# Patient Record
Sex: FEMALE | Race: WHITE
[De-identification: names, ages, dates, MRNs, and addresses within clinical notes are randomized per-mention and may not be internally consistent; named-entity substitution may affect disease eponyms.]

---

## 2018-04-06 ENCOUNTER — HOSPITAL ENCOUNTER (OUTPATIENT)
Dept: HOSPITAL 17 - NEDAMB | Age: 83
Setting detail: OBSERVATION
LOS: 3 days | Discharge: HOME | End: 2018-04-09
Attending: HOSPITALIST | Admitting: HOSPITALIST
Payer: MEDICARE

## 2018-04-06 VITALS
OXYGEN SATURATION: 95 % | TEMPERATURE: 96.2 F | RESPIRATION RATE: 18 BRPM | DIASTOLIC BLOOD PRESSURE: 63 MMHG | SYSTOLIC BLOOD PRESSURE: 140 MMHG | HEART RATE: 60 BPM

## 2018-04-06 VITALS — WEIGHT: 170.86 LBS | HEIGHT: 62 IN | BODY MASS INDEX: 31.44 KG/M2

## 2018-04-06 DIAGNOSIS — I25.10: ICD-10-CM

## 2018-04-06 DIAGNOSIS — R29.6: ICD-10-CM

## 2018-04-06 DIAGNOSIS — E11.649: Primary | ICD-10-CM

## 2018-04-06 DIAGNOSIS — R07.89: ICD-10-CM

## 2018-04-06 DIAGNOSIS — F41.9: ICD-10-CM

## 2018-04-06 DIAGNOSIS — S42.401D: ICD-10-CM

## 2018-04-06 DIAGNOSIS — M79.601: ICD-10-CM

## 2018-04-06 DIAGNOSIS — Z95.1: ICD-10-CM

## 2018-04-06 DIAGNOSIS — G89.29: ICD-10-CM

## 2018-04-06 DIAGNOSIS — M54.9: ICD-10-CM
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
## 2018-04-06 DIAGNOSIS — Z79.4: ICD-10-CM

## 2018-04-06 DIAGNOSIS — R45.851: ICD-10-CM

## 2018-04-06 DIAGNOSIS — Z95.810: ICD-10-CM

## 2018-04-06 DIAGNOSIS — I10: ICD-10-CM

## 2018-04-06 LAB
ALBUMIN SERPL-MCNC: 2.8 GM/DL (ref 3.4–5)
ALP SERPL-CCNC: 142 U/L (ref 45–117)
ALT SERPL-CCNC: 9 U/L (ref 10–53)
APAP SERPL-MCNC: (no result) MCG/ML (ref 10–30)
AST SERPL-CCNC: 19 U/L (ref 15–37)
BASOPHILS # BLD AUTO: 0.1 TH/MM3 (ref 0–0.2)
BASOPHILS NFR BLD: 0.9 % (ref 0–2)
BILIRUB SERPL-MCNC: 0.5 MG/DL (ref 0.2–1)
BUN SERPL-MCNC: 13 MG/DL (ref 7–18)
CALCIUM SERPL-MCNC: 8.7 MG/DL (ref 8.5–10.1)
CHLORIDE SERPL-SCNC: 106 MEQ/L (ref 98–107)
CREAT SERPL-MCNC: 0.82 MG/DL (ref 0.5–1)
EOSINOPHIL # BLD: 0.1 TH/MM3 (ref 0–0.4)
EOSINOPHIL NFR BLD: 2.3 % (ref 0–4)
ERYTHROCYTE [DISTWIDTH] IN BLOOD BY AUTOMATED COUNT: 16.2 % (ref 11.6–17.2)
GFR SERPLBLD BASED ON 1.73 SQ M-ARVRAT: 67 ML/MIN (ref 89–?)
GLUCOSE SERPL-MCNC: 34 MG/DL (ref 74–106)
HCO3 BLD-SCNC: 24.5 MEQ/L (ref 21–32)
HCT VFR BLD CALC: 30.1 % (ref 35–46)
HGB BLD-MCNC: 9.8 GM/DL (ref 11.6–15.3)
LYMPHOCYTES # BLD AUTO: 2.1 TH/MM3 (ref 1–4.8)
LYMPHOCYTES NFR BLD AUTO: 33 % (ref 9–44)
MCH RBC QN AUTO: 28.8 PG (ref 27–34)
MCHC RBC AUTO-ENTMCNC: 32.5 % (ref 32–36)
MCV RBC AUTO: 88.6 FL (ref 80–100)
MONOCYTE #: 0.6 TH/MM3 (ref 0–0.9)
MONOCYTES NFR BLD: 10 % (ref 0–8)
NEUTROPHILS # BLD AUTO: 3.4 TH/MM3 (ref 1.8–7.7)
NEUTROPHILS NFR BLD AUTO: 53.8 % (ref 16–70)
PLATELET # BLD: 203 TH/MM3 (ref 150–450)
PMV BLD AUTO: 8.2 FL (ref 7–11)
PROT SERPL-MCNC: 7.8 GM/DL (ref 6.4–8.2)
RBC # BLD AUTO: 3.4 MIL/MM3 (ref 4–5.3)
SODIUM SERPL-SCNC: 140 MEQ/L (ref 136–145)
WBC # BLD AUTO: 6.4 TH/MM3 (ref 4–11)

## 2018-04-06 PROCEDURE — 96372 THER/PROPH/DIAG INJ SC/IM: CPT

## 2018-04-06 PROCEDURE — 97116 GAIT TRAINING THERAPY: CPT

## 2018-04-06 PROCEDURE — 29105 APPLICATION LONG ARM SPLINT: CPT

## 2018-04-06 PROCEDURE — 80307 DRUG TEST PRSMV CHEM ANLYZR: CPT

## 2018-04-06 PROCEDURE — 96374 THER/PROPH/DIAG INJ IV PUSH: CPT

## 2018-04-06 PROCEDURE — 97166 OT EVAL MOD COMPLEX 45 MIN: CPT

## 2018-04-06 PROCEDURE — 96375 TX/PRO/DX INJ NEW DRUG ADDON: CPT

## 2018-04-06 PROCEDURE — 97163 PT EVAL HIGH COMPLEX 45 MIN: CPT

## 2018-04-06 PROCEDURE — 83036 HEMOGLOBIN GLYCOSYLATED A1C: CPT

## 2018-04-06 PROCEDURE — 80053 COMPREHEN METABOLIC PANEL: CPT

## 2018-04-06 PROCEDURE — 84443 ASSAY THYROID STIM HORMONE: CPT

## 2018-04-06 PROCEDURE — 76937 US GUIDE VASCULAR ACCESS: CPT

## 2018-04-06 PROCEDURE — 71111 X-RAY EXAM RIBS/CHEST4/> VWS: CPT

## 2018-04-06 PROCEDURE — 73090 X-RAY EXAM OF FOREARM: CPT

## 2018-04-06 PROCEDURE — 70450 CT HEAD/BRAIN W/O DYE: CPT

## 2018-04-06 PROCEDURE — 73060 X-RAY EXAM OF HUMERUS: CPT

## 2018-04-06 PROCEDURE — 82948 REAGENT STRIP/BLOOD GLUCOSE: CPT

## 2018-04-06 PROCEDURE — 85025 COMPLETE CBC W/AUTO DIFF WBC: CPT

## 2018-04-06 PROCEDURE — 73110 X-RAY EXAM OF WRIST: CPT

## 2018-04-06 PROCEDURE — G0378 HOSPITAL OBSERVATION PER HR: HCPCS

## 2018-04-06 PROCEDURE — 96361 HYDRATE IV INFUSION ADD-ON: CPT

## 2018-04-06 PROCEDURE — 99285 EMERGENCY DEPT VISIT HI MDM: CPT

## 2018-04-06 NOTE — RADRPT
EXAM DATE/TIME:  04/06/2018 19:46 

 

[facility] COMPARISON:     

No previous studies available for comparison.

 

                     

INDICATIONS :     

Rib pain with no known injury

                     

 

MEDICAL HISTORY :     

None.          

 

SURGICAL HISTORY :     

CABG. Pacemaker.  

 

ENCOUNTER:     

Initial                                        

 

ACUITY:     

1 day      

 

PAIN SCORE:     

10/10

 

LOCATION:     

Bilateral  ribs

 

FINDINGS:     

Pacer on the left.  Expiratory chest reveals no pneumothorax.  Nondisplaced fractures of the right ei
ghth and seventh ribs.

CONCLUSION:     Right rib fractures.  No pneumothorax.  Limited exam.  

 

 

 Manuel Garrett MD FACR on April 06, 2018 at 20:36           

Board Certified Radiologist.

 This report was verified electronically.

## 2018-04-06 NOTE — RADRPT
EXAM DATE/TIME:  04/06/2018 21:22 

 

[facility] COMPARISON:     No previous studies available for comparison.

 

                     

INDICATIONS :     Fall. Right upper arm pain.

                     

MEDICAL HISTORY :     None.          

SURGICAL HISTORY :     None.   

ENCOUNTER:     Initial                                        

ACUITY:     1 day      

PAIN SCORE:     8/10

LOCATION:     Right upper extremity 

 

FINDINGS:     

Plate with screws is seen bridging the fracture of the humerus.  Alignment is anatomic.  Skin staples
 are evident.

CONCLUSION:     Plate in good position.  Anatomic alignment.  

 

 Manuel Garrett MD FACR on April 06, 2018 at 21:43           

Board Certified Radiologist.

 This report was verified electronically.

## 2018-04-06 NOTE — RADRPT
EXAM DATE/TIME:  04/06/2018 19:52 

 

[facility] COMPARISON:     

No previous studies available for comparison.

 

                     

INDICATIONS :     

Right forearm pain with no known injury

                     

 

MEDICAL HISTORY :     

None.          

 

SURGICAL HISTORY :     

None.   

 

ENCOUNTER:     

Initial                                        

 

ACUITY:     

1 day      

 

PAIN SCORE:     

10/10

 

LOCATION:     

Right  entire forearm

 

FINDINGS:     

Limited exam because of uncooperative patient.  No displaced fracture.

CONCLUSION:     Very limited exam.  Subtle fractures cannot be excluded.  

 

 

 Manuel Garrett MD FACR on April 06, 2018 at 20:40           

Board Certified Radiologist.

 This report was verified electronically.

## 2018-04-06 NOTE — HHI.HP
__________________________________________________





HPI


Service


[facility]


Primary Care Physician


Unknown


Admission Diagnosis





acute hypoglycemia, DM on insulin, suicidal ideation, BA


Diagnoses:  


(1) Hypoglycemia


Diagnosis:  Principal





(2) Humerus fracture


Diagnosis:  Principal





(3) Suicidal ideation


Diagnosis:  Principal





Travel History


International Travel<30 Days:  No


Contact w/Intl Traveler <30 Da:  No


Traveled to Known Affected Are:  No


History of Present Illness


This is an 82-year-old female with a PMH of DM who is brought to the ER under 

Baker Act for suicidal ideation.  Per report, she had called her therapist to 

tell her she was having suicidal ideation and Police sent to her home.  Pt 

unable to give much history as lethargic on exam, however able to answer some 

questions in Estonian.  Tells me she takes Insulin 68u qam, which she took this 

morning, in addition to sliding scale Insulin.  Denies fever, chills, nausea or 

vomiting.  Per records, pt w/ recent fall for which she was seen in [facility], found to have humerus fracture, s/p surgery, currently in splint.  While 

in ER, pt noted to be hypoglycemic w/ BS 34, s/p Orange Juice and Dextrose, 

repeat BS 45, later 71. Pt remains lethargic.





Review of Systems


Except as stated in HPI:  all other systems reviewed are Neg


ROS: Limited secondary to lethargy





Past Family Social History


Past Medical History


PMH: DM


Past Surgical History


PAST SURGICAL HISTORY: Unknown


Allergies:  


Coded Allergies:  


     No Known Allergies (Unverified , 18)


Family History


PAST FAMILY HISTORY:  Reviewed.  No h/o DM or CAD


Social History


PAST SOCIAL HISTORY: Negative for alcohol, tobacco or drugs.





Physical Exam


Vital Signs





Vital Signs








  Date Time  Temp Pulse Resp B/P (MAP) Pulse Ox O2 Delivery O2 Flow Rate FiO2


 


18 18:24 96.2 60 18 140/63 (88) 95 Nasal Cannula 2.00 








Physical Exam


PE:


GENERAL: Elderly  female in no acute distress, lethargic but rouses and 

answers few questions.


HEENT: PERRLA, EOMI. No scleral icterus or conjunctival pallor. No lid lag or 

facial droop.  


CARDIOVASCULAR: Regular rate and rhythm.  No obvious murmurs to auscultation. 

No chest tenderness to palpation. 


RESPIRATORY: No obvious rhonchi or wheezing. Clear to auscultation. Breath 

sounds equal bilaterally. 


GASTROINTESTINAL: Abdomen soft, non-tender, nondistended. BS normal. 


MUSCULOSKELETAL: Extremities without clubbing, cyanosis, or edema. No obvious 

deformities. RUE s/p splint in sling.


NEUROLOGICAL: Lethargic, answers some questions. No focal neurologic deficits. 

Moving both upper and lower extremities spontaneously.


Laboratory





Laboratory Tests








Test


  18


18:20 18


20:58


 


White Blood Count 6.4  


 


Red Blood Count 3.40  


 


Hemoglobin 9.8  


 


Hematocrit 30.1  


 


Mean Corpuscular Volume 88.6  


 


Mean Corpuscular Hemoglobin 28.8  


 


Mean Corpuscular Hemoglobin


Concent 32.5 


  


 


 


Red Cell Distribution Width 16.2  


 


Platelet Count 203  


 


Mean Platelet Volume 8.2  


 


Neutrophils (%) (Auto) 53.8  


 


Lymphocytes (%) (Auto) 33.0  


 


Monocytes (%) (Auto) 10.0  


 


Eosinophils (%) (Auto) 2.3  


 


Basophils (%) (Auto) 0.9  


 


Neutrophils # (Auto) 3.4  


 


Lymphocytes # (Auto) 2.1  


 


Monocytes # (Auto) 0.6  


 


Eosinophils # (Auto) 0.1  


 


Basophils # (Auto) 0.1  


 


CBC Comment DIFF FINAL  


 


Differential Comment   


 


Salicylates Level LESS THAN 1.7  


 


Blood Urea Nitrogen  13 


 


Creatinine  0.82 


 


Random Glucose  34 


 


Total Protein  7.8 


 


Albumin  2.8 


 


Calcium Level  8.7 


 


Alkaline Phosphatase  142 


 


Aspartate Amino Transf


(AST/SGOT) 


  19 


 


 


Alanine Aminotransferase


(ALT/SGPT) 


  9 


 


 


Total Bilirubin  0.5 


 


Sodium Level  140 


 


Potassium Level  3.8 


 


Chloride Level  106 


 


Carbon Dioxide Level  24.5 


 


Anion Gap  10 


 


Estimat Glomerular Filtration


Rate 


  67 


 


 


Thyroid Stimulating Hormone


3rd Gen 


  3.040 


 


 


Acetaminophen Level  LESS THAN 2.0 


 


Ethyl Alcohol Level  LESS THAN 3 








Result Diagram:  


18








Caprini VTE Risk Assessment


Caprini VTE Risk Assessment:  No/Low Risk (score <= 1)


Caprini Risk Assessment Model











 Point Value = 1          Point Value = 2  Point Value = 3  Point Value = 5


 


Age 41-60


Minor surgery


BMI > 25 kg/m2


Swollen legs


Varicose veins


Pregnancy or postpartum


History of unexplained or recurrent


   spontaneous 


Oral contraceptives or hormone


   replacement


Sepsis (< 1 month)


Serious lung disease, including


   pneumonia (< 1 month)


Abnormal pulmonary function


Acute myocardial infarction


Congestive heart failure (< 1 month)


History of inflammatory bowel disease


Medical patient at bed rest Age 61-74


Arthroscopic surgery


Major open surgery (> 45 min)


Laparoscopic surgery (> 45 min)


Malignancy


Confined to bed (> 72 hours)


Immobilizing plaster cast


Central venous access Age >= 75


History of VTE


Family history of VTE


Factor V Leiden


Prothrombin 32186R


Lupus anticoagulant


Anticardiolipin antibodies


Elevated serum homocysteine


Heparin-induced thrombocytopenia


Other congenital or acquired


   thrombophilia Stroke (< 1 month)


Elective arthroplasty


Hip, pelvis, or leg fracture


Acute spinal cord injury (< 1 month)








Prophylaxis Regimen











   Total Risk


Factor Score Risk Level Prophylaxis Regimen


 


0-1      Low Early ambulation


 


2 Moderate Order ONE of the following:


*Sequential Compression Device (SCD)


*Heparin 5000 units SQ BID


 


3-4 Higher Order ONE of the following medications:


*Heparin 5000 units SQ TID


*Enoxaparin/Lovenox 40 mg SQ daily (WT < 150 kg, CrCl > 30 mL/min)


*Enoxaparin/Lovenox 30 mg SQ daily (WT < 150 kg, CrCl > 10-29 mL/min)


*Enoxaparin/Lovenox 30 mg SQ BID (WT < 150 kg, CrCl > 30 mL/min)


AND/OR


*Sequential Compression Device (SCD)


 


5 or more Highest Order ONE of the following medications:


*Heparin 5000 units SQ TID (Preferred with Epidurals)


*Enoxaparin/Lovenox 40 mg SQ daily (WT < 150 kg, CrCl > 30 mL/min)


*Enoxaparin/Lovenox 30 mg SQ daily (WT < 150 kg, CrCl > 10-29 mL/min)


*Enoxaparin/Lovenox 30 mg SQ BID (WT < 150 kg, CrCl > 30 mL/min)


AND


*Sequential Compression Device (SCD)











Assessment and Plan


Problem List:  


(1) Hypoglycemia


ICD Code:  E16.2 - Hypoglycemia, unspecified


Status:  Acute


(2) Humerus fracture


ICD Code:  S42.309A - Unspecified fracture of shaft of humerus, unspecified arm

, initial encounter for closed fracture


Status:  Acute


(3) Suicidal ideation


ICD Code:  R45.851 - Suicidal ideations


Status:  Acute


Assessment and Plan


A/P:


1.  Hypoglycemia:  BS 34 on arrival, s/p D50 and Orange juice w/ repeat BS 45 

and 71.  Reports taking Insulin 68u qam in addition to sliding scale, states 

took meds this morning.  Hold Insulin/Sliding Scale, start D5WNS for persistent 

hypoglycemia, Accu-cheks.  Check Hgb A1c. 


2.  Humerus Fx:  s/p recent fall w/ splint in [facility], outpatient Ortho 

follow up as scheduled.  Hold analgesics in light of lethargy/sedation.  

Humerus X-ray w/ plate in good position, images reviewed by me. 


3.  Suicidal Ideation:  currently under Baker Act by Police after pt called 

therapist and relayed suicidal ideation, apparently later stated it was a joke.

  Sitter.  Consult Psych in am for further assessment once mental status 

improved.


4.  DVT Prophylaxis:  SCD/Teds. 


5.  Social work for d/c planning as needed. 


6.  Case discussed w/ ER physician at length, labs/records/imaging reviewed by 

me.





Problem Qualifiers





(1) Humerus fracture:  


Qualified Codes:  S42.401D - Unspecified fracture of lower end of right humerus

, subsequent encounter for fracture with routine healing








Glenna Richards MD 2018 23:02

## 2018-04-06 NOTE — PD
HPI


Chief Complaint:  Psychiatric Symptoms


Time Seen by Provider:  19:19


Travel History


International Travel<30 days:  No


Contact w/Intl Traveler<30days:  No


Traveled to known affect area:  No





History of Present Illness


HPI


82-year-old female that presents to the ED for evaluation of Moulton act.  

Patient was brought here under a Baker act secondary for apparently contacting 

the  and telling them that she wanted to kill herself.  Per 

patient she told this as a joke and not really meant to be serious.  Before she 

knew it she had the police in her house and she was Moul[place] acted.  She denies 

depression or suicidal ideation.  She denies any history of anxiety.  Per 

patient she does tell me that she has had multiple falls recently and she was 

seen [place] at some point and had a fall that required apparently 

surgery to her right arm.  Patient is unable to give me much history.  She does 

appear to be somewhat altered.  I was able to figure out the patient does speak 

[place] and she is able to speak to me a little better in [place].  She states 

that she has been falling a couple times recently.  She cannot really tell me 

when.  She denies any allergies to medication.  Per patient she has a history 

of high blood pressure, diabetes and takes insulin.  She has no allergies to 

medication.  She was brought here as a Baker act under police custody.  She 

states that she has right rib pain as well as right arm pain.





[facility]


Past Medical History


Medical History:  Unable to Obtain


Tetanus Vaccination:  Unknown





Past Surgical History


Surgical History:  Unable to Obtain





Social History


Alcohol Use:  No


Tobacco Use:  No


Substance Use:  No





Allergies-Medications


(Allergen,Severity, Reaction):  


Coded Allergies:  


     No Known Allergies (Unverified , 4/6/18)





Review of Systems


ROS Limitations:  Poor Historian


Except as stated in HPI:  all other systems reviewed are Neg





Physical Exam


Exam Limitations:  Poor Historian


Narrative


GENERAL: 


SKIN: Warm and dry.


HEAD: Atraumatic. Normocephalic. 


EYES: Pupils equal and round. No scleral icterus. No injection or drainage. 


ENT: No nasal bleeding or discharge.  Mucous membranes pink and moist.  Tongue 

is midline.  No uvula deviation.


NECK: Trachea midline. No JVD. 


CARDIOVASCULAR: Regular rate and rhythm.  No murmurs, S3, S4.


RESPIRATORY: No accessory muscle use. Clear to auscultation. Breath sounds 

equal bilaterally. 


GASTROINTESTINAL: Abdomen soft, non-tender, nondistended. Hepatic and splenic 

margins not palpable. 


MUSCULOSKELETAL: Extremities without clubbing, cyanosis, or edema. No obvious 

deformities.  Full range of motion of the upper and lower extremities 

bilaterally with exception of the right arm were patient has what appears to be 

a long-arm splint that appears to be falling off her arm.  She does have 

tenderness to palpation on the right ribs.  No other deformities noted.


NEUROLOGICAL: Awake and alert. No obvious cranial nerve deficits.  Motor 

grossly within normal limits. Five out of 5 muscle strength in the arms and 

legs.  Normal speech.


PSYCHIATRIC: Appropriate mood and affect; insight and judgment normal.





Data


Data


Last Documented VS





Vital Signs








  Date Time  Temp Pulse Resp B/P (MAP) Pulse Ox O2 Delivery O2 Flow Rate FiO2


 


4/6/18 18:24 96.2 60 18 140/63 (88) 95 Nasal Cannula 2.00 








Orders





 Orders


Complete Blood Count With Diff (4/6/18 17:44)


Comprehensive Metabolic Panel (4/6/18 17:44)


Thyroid Stimulating Hormone (4/6/18 17:44)


Urinalysis - C+S If Indicated (4/6/18 17:44)


Psych Screen (4/6/18 17:44)


Drug Screen, Random Urine (4/6/18 17:44)


Alcohol (Ethanol) (4/6/18 17:44)


Salicylates (Aspirin) (4/6/18 17:44)


Tylenol (Acetaminophen) (4/6/18 17:44)


Ct Brain W/O Iv Contrast(Rout) (4/6/18 )


Ribs, Bilat(W/Exp Cxr-Min 4vw) (4/6/18 )


Forearm (2vws) (4/6/18 )


Acetamin-Hydrocod 325-5 Mg (Norco  5-325 (4/6/18 19:45)


Splint Or Brace Apply/Monitor (4/6/18 20:33)


Wrist, Complete (Wpz5sts) (4/6/18 )


Humerus (Min 2vws) (4/6/18 )


Dextrose 50% In Cherry (Syr) Inj (D50w (Syr (4/6/18 22:15)


Blood Glucose (4/6/18 22:36)


Dextrose 50% In Cherry (Vial) Inj (D50w (Vi (4/6/18 22:45)


^ Sitter (4/6/18 23:00)


Admit Order (Ed Use Only) (4/6/18 23:00)





Labs





Laboratory Tests








Test


  4/6/18


18:20 4/6/18


20:58


 


White Blood Count 6.4 TH/MM3  


 


Red Blood Count 3.40 MIL/MM3  


 


Hemoglobin 9.8 GM/DL  


 


Hematocrit 30.1 %  


 


Mean Corpuscular Volume 88.6 FL  


 


Mean Corpuscular Hemoglobin 28.8 PG  


 


Mean Corpuscular Hemoglobin


Concent 32.5 % 


  


 


 


Red Cell Distribution Width 16.2 %  


 


Platelet Count 203 TH/MM3  


 


Mean Platelet Volume 8.2 FL  


 


Neutrophils (%) (Auto) 53.8 %  


 


Lymphocytes (%) (Auto) 33.0 %  


 


Monocytes (%) (Auto) 10.0 %  


 


Eosinophils (%) (Auto) 2.3 %  


 


Basophils (%) (Auto) 0.9 %  


 


Neutrophils # (Auto) 3.4 TH/MM3  


 


Lymphocytes # (Auto) 2.1 TH/MM3  


 


Monocytes # (Auto) 0.6 TH/MM3  


 


Eosinophils # (Auto) 0.1 TH/MM3  


 


Basophils # (Auto) 0.1 TH/MM3  


 


CBC Comment DIFF FINAL  


 


Differential Comment   


 


Salicylates Level


  LESS THAN 1.7


MG/DL 


 


 


Blood Urea Nitrogen  13 MG/DL 


 


Creatinine  0.82 MG/DL 


 


Random Glucose  34 MG/DL 


 


Total Protein  7.8 GM/DL 


 


Albumin  2.8 GM/DL 


 


Calcium Level  8.7 MG/DL 


 


Alkaline Phosphatase  142 U/L 


 


Aspartate Amino Transf


(AST/SGOT) 


  19 U/L 


 


 


Alanine Aminotransferase


(ALT/SGPT) 


  9 U/L 


 


 


Total Bilirubin  0.5 MG/DL 


 


Sodium Level  140 MEQ/L 


 


Potassium Level  3.8 MEQ/L 


 


Chloride Level  106 MEQ/L 


 


Carbon Dioxide Level  24.5 MEQ/L 


 


Anion Gap  10 MEQ/L 


 


Estimat Glomerular Filtration


Rate 


  67 ML/MIN 


 


 


Thyroid Stimulating Hormone


3rd Gen 


  3.040 uIU/ML 


 


 


Acetaminophen Level


  


  LESS THAN 2.0


MCG/ML


 


Ethyl Alcohol Level


  


  LESS THAN 3


MG/DL











MDM


Medical Decision Making


Medical Screen Exam Complete:  Yes


Emergency Medical Condition:  Yes


Medical Record Reviewed:  Yes


Interpretation(s)


CBC & BMP Diagram


4/6/18 18:20








4/6/18 20:58








Total Protein 7.8, Albumin 2.8 L, Calcium Level 8.7, Alkaline Phosphatase 142 H

, Aspartate Amino Transf (AST/SGOT) 19, Alanine Aminotransferase (ALT/SGPT) 9 L

, Total Bilirubin 0.5








Last Impressions








Wrist X-Ray 4/6/18 0000 Signed





Impressions: 





 Service Date/Time:  Friday, April 6, 2018 21:24 - CONCLUSION:  Osteopenia with 





 degenerative changes, no fracture     Manuel Garrett MD  FACR


 


Ribs X-Ray 4/6/18 0000 Signed





Impressions: 





 Service Date/Time:  Friday, April 6, 2018 19:46 - CONCLUSION: Right rib 





 fractures.  No pneumothorax.  Limited exam.      MD CLARISA DiezR


 


Radius/Ulna X-Ray 4/6/18 0000 Signed





Impressions: 





 Service Date/Time:  Friday, April 6, 2018 19:52 - CONCLUSION: Very limited 

exam. 





  Subtle fractures cannot be excluded.      MD CLARISA DiezR


 


Humerus X-Ray 4/6/18 0000 Signed





Impressions: 





 Service Date/Time:  Friday, April 6, 2018 21:22 - CONCLUSION: Plate in good 





 position.  Anatomic alignment.     Manuel Garrett MD  FACR


 


Head CT 4/6/18 0000 Signed





Impressions: 





 Service Date/Time:  Friday, April 6, 2018 20:00 - CONCLUSION: Negative for 

acute 





 process     Manuel Garrett MD  FACR








Differential Diagnosis


Depression versus suicidal ideation versus anxiety versus adjustment disorder 

versus mood disorder versus bipolar disorder versus schizophrenia versus 

paranoid disorder versus psychosis versus substance abuse versus alcohol abuse 

versus alcohol induced psychosis versus homicidality addition versus cutting 

versus personality disorder versus hypoglycemia versus fall versus fracture 

versus inability to take care of self


Narrative Course


82-year-old female that presents to the ED for evaluation of a Moul[place] act.  

Patient was properly examined and was found to have signs and symptoms 

consistent appears to be a Baker act.  Clear of her history as patient appears 

to be somewhat difficult to get history from.  Patient does speak [place] but 

even when she speaks to me in [place] she seems to have a little difficulty 

remembering things.  Labs and imaging were ordered.  Arm had to be resplinted 

as it appears to be falling off.  Imaging did show what appears to have plates 

to her left humerus from her fracture.  She cannot really tell me how long ago 

that this happened but this appears to be recent.  She does appear to have 

rubbery pain which appears to be possibly chronic from the fall that caused a 

fracture.  X-rays did show fractures of the ribs here.  No sign of other 

disease.  Labs were positive for hypoglycemia.  I was able to get more history 

from her and she does tell me that she takes insulins and has a diabetic.  She 

was given dextrose 50 IV with no improvement of her sugars.  Her sugar was 

rechecked half an hour later was still 36.  She was given also juice and food.  

Another dose of D50 was given.  Still not dramatic improvement.  This was 

discussed with my attending Dr. Mccabe who recommends admission to medicine as 

patient cannot be medically cleared until alteration from hypoglycemia is 

fixed. [facility] was paged and Dr Richards agrees to obs admission.





Diagnosis





 Primary Impression:  


 Hypoglycemia


 Additional Impressions:  


 Suicidal ideation


 Humerus fracture


 Qualified Codes:  S42.401D - Unspecified fracture of lower end of right humerus

, subsequent encounter for fracture with routine healing











Jim Frias Apr 6, 2018 22:55

## 2018-04-06 NOTE — RADRPT
EXAM DATE/TIME:  04/06/2018 20:00 

 

[facility] COMPARISON:     

No previous studies available for comparison.

 

 

INDICATIONS :     

Altered mental status.

                      

 

RADIATION DOSE:     

36.49 CTDIvol (mGy) 

 

 

 

MEDICAL HISTORY :     

Non-responsive.  

 

SURGICAL HISTORY :      

Non-responsive. 

 

ENCOUNTER:      

Initial

 

ACUITY:      

1 day

 

PAIN SCALE:      

0/10

 

LOCATION:        

cranial 

 

TECHNIQUE:     

Multiple contiguous axial images were obtained of the head.  Using automated exposure control and adj
ustment of the mA and/or kV according to patient size, radiation dose was kept as low as reasonably a
chievable to obtain optimal diagnostic quality images.   DICOM format image data is available electro
nically for review and comparison.  

 

FINDINGS:     

 

CEREBRUM:     

The ventricles are normal for age.  No evidence of midline shift, mass lesion, hemorrhage or acute in
farction.  No extra-axial fluid collections are seen.

 

POSTERIOR FOSSA:     

The cerebellum and brainstem are intact.  The 4th ventricle is midline.  The cerebellopontine angle i
s unremarkable.

 

EXTRACRANIAL:     

The visualized portion of the orbits is intact.

 

SKULL:     

The calvaria is intact.  No evidence of skull fracture.

 

CONCLUSION:     Negative for acute process 

 

 

 Manuel Garrett MD FACR on April 06, 2018 at 20:41           

Board Certified Radiologist.

 This report was verified electronically.

## 2018-04-06 NOTE — RADRPT
EXAM DATE/TIME:  04/06/2018 21:24 

 

[facility] COMPARISON:     

No previous studies available for comparison.

 

                     

INDICATIONS :     

Fall. Right wrist pain.

                     

 

MEDICAL HISTORY :     

None.          

 

SURGICAL HISTORY :     

None.   

 

ENCOUNTER:     

Initial                                        

 

ACUITY:     

1 day      

 

PAIN SCORE:     

8/10

 

LOCATION:     

Right upper extremity 

 

FINDINGS:     

Degenerative changes about the carpus.  Alignment anatomic.  Fracture is not appreciated.  The family
 of the first metacarpal is noted.  Carpal deformity is evident.

 

CONCLUSION:     

Osteopenia with degenerative changes, no fracture

 

 

 

 Manuel Garrett MD FACR on April 06, 2018 at 21:44           

Board Certified Radiologist.

 This report was verified electronically.

## 2018-04-07 VITALS
SYSTOLIC BLOOD PRESSURE: 198 MMHG | DIASTOLIC BLOOD PRESSURE: 80 MMHG | OXYGEN SATURATION: 96 % | HEART RATE: 68 BPM | RESPIRATION RATE: 20 BRPM | TEMPERATURE: 97.9 F

## 2018-04-07 VITALS
TEMPERATURE: 98.8 F | HEART RATE: 60 BPM | DIASTOLIC BLOOD PRESSURE: 60 MMHG | SYSTOLIC BLOOD PRESSURE: 125 MMHG | OXYGEN SATURATION: 96 % | RESPIRATION RATE: 16 BRPM

## 2018-04-07 VITALS
HEART RATE: 61 BPM | SYSTOLIC BLOOD PRESSURE: 137 MMHG | OXYGEN SATURATION: 92 % | DIASTOLIC BLOOD PRESSURE: 62 MMHG | TEMPERATURE: 98.1 F | RESPIRATION RATE: 16 BRPM

## 2018-04-07 VITALS
HEART RATE: 65 BPM | SYSTOLIC BLOOD PRESSURE: 197 MMHG | DIASTOLIC BLOOD PRESSURE: 78 MMHG | RESPIRATION RATE: 20 BRPM | TEMPERATURE: 98.3 F | OXYGEN SATURATION: 96 %

## 2018-04-07 VITALS
HEART RATE: 62 BPM | OXYGEN SATURATION: 96 % | DIASTOLIC BLOOD PRESSURE: 72 MMHG | RESPIRATION RATE: 20 BRPM | TEMPERATURE: 98.2 F | SYSTOLIC BLOOD PRESSURE: 180 MMHG

## 2018-04-07 VITALS
HEART RATE: 62 BPM | SYSTOLIC BLOOD PRESSURE: 210 MMHG | TEMPERATURE: 98.6 F | DIASTOLIC BLOOD PRESSURE: 86 MMHG | RESPIRATION RATE: 18 BRPM | OXYGEN SATURATION: 62 %

## 2018-04-07 LAB
ALBUMIN SERPL-MCNC: 2.5 GM/DL (ref 3.4–5)
ALP SERPL-CCNC: 125 U/L (ref 45–117)
ALT SERPL-CCNC: 8 U/L (ref 10–53)
AST SERPL-CCNC: 17 U/L (ref 15–37)
BASOPHILS # BLD AUTO: 0 TH/MM3 (ref 0–0.2)
BASOPHILS NFR BLD: 0.5 % (ref 0–2)
BILIRUB SERPL-MCNC: 0.5 MG/DL (ref 0.2–1)
BUN SERPL-MCNC: 12 MG/DL (ref 7–18)
CALCIUM SERPL-MCNC: 8.7 MG/DL (ref 8.5–10.1)
CHLORIDE SERPL-SCNC: 104 MEQ/L (ref 98–107)
CREAT SERPL-MCNC: 0.6 MG/DL (ref 0.5–1)
EOSINOPHIL # BLD: 0.1 TH/MM3 (ref 0–0.4)
EOSINOPHIL NFR BLD: 1.1 % (ref 0–4)
ERYTHROCYTE [DISTWIDTH] IN BLOOD BY AUTOMATED COUNT: 16 % (ref 11.6–17.2)
GFR SERPLBLD BASED ON 1.73 SQ M-ARVRAT: 96 ML/MIN (ref 89–?)
GLUCOSE SERPL-MCNC: 110 MG/DL (ref 74–106)
HBA1C MFR BLD: 6.6 % (ref 4.3–6)
HCO3 BLD-SCNC: 29.2 MEQ/L (ref 21–32)
HCT VFR BLD CALC: 31.6 % (ref 35–46)
HGB BLD-MCNC: 10.5 GM/DL (ref 11.6–15.3)
LYMPHOCYTES # BLD AUTO: 1.7 TH/MM3 (ref 1–4.8)
LYMPHOCYTES NFR BLD AUTO: 22 % (ref 9–44)
MCH RBC QN AUTO: 29 PG (ref 27–34)
MCHC RBC AUTO-ENTMCNC: 33.2 % (ref 32–36)
MCV RBC AUTO: 87.3 FL (ref 80–100)
MONOCYTE #: 0.6 TH/MM3 (ref 0–0.9)
MONOCYTES NFR BLD: 8.1 % (ref 0–8)
NEUTROPHILS # BLD AUTO: 5.3 TH/MM3 (ref 1.8–7.7)
NEUTROPHILS NFR BLD AUTO: 68.3 % (ref 16–70)
PLATELET # BLD: 347 TH/MM3 (ref 150–450)
PMV BLD AUTO: 7.9 FL (ref 7–11)
PROT SERPL-MCNC: 6.9 GM/DL (ref 6.4–8.2)
RBC # BLD AUTO: 3.62 MIL/MM3 (ref 4–5.3)
SODIUM SERPL-SCNC: 139 MEQ/L (ref 136–145)
WBC # BLD AUTO: 7.7 TH/MM3 (ref 4–11)

## 2018-04-07 RX ADMIN — ATORVASTATIN CALCIUM SCH MG: 40 TABLET, FILM COATED ORAL at 17:49

## 2018-04-07 RX ADMIN — LISINOPRIL SCH MG: 20 TABLET ORAL at 17:48

## 2018-04-07 RX ADMIN — OXYCODONE HYDROCHLORIDE AND ACETAMINOPHEN PRN TAB: 7.5; 325 TABLET ORAL at 21:42

## 2018-04-07 RX ADMIN — ENOXAPARIN SODIUM SCH MG: 40 INJECTION SUBCUTANEOUS at 17:46

## 2018-04-07 RX ADMIN — METOPROLOL TARTRATE SCH MG: 50 TABLET, FILM COATED ORAL at 17:51

## 2018-04-07 RX ADMIN — STANDARDIZED SENNA CONCENTRATE AND DOCUSATE SODIUM SCH TAB: 8.6; 5 TABLET, FILM COATED ORAL at 17:48

## 2018-04-07 RX ADMIN — ISOSORBIDE MONONITRATE SCH MG: 60 TABLET, EXTENDED RELEASE ORAL at 17:46

## 2018-04-07 RX ADMIN — Medication SCH ML: at 09:00

## 2018-04-07 RX ADMIN — Medication SCH ML: at 21:42

## 2018-04-07 RX ADMIN — OXYCODONE HYDROCHLORIDE AND ACETAMINOPHEN PRN TAB: 7.5; 325 TABLET ORAL at 17:48

## 2018-04-07 RX ADMIN — GABAPENTIN SCH MG: 300 CAPSULE ORAL at 17:47

## 2018-04-07 RX ADMIN — STANDARDIZED SENNA CONCENTRATE AND DOCUSATE SODIUM SCH TAB: 8.6; 5 TABLET, FILM COATED ORAL at 09:00

## 2018-04-07 RX ADMIN — PANTOPRAZOLE SODIUM SCH MG: 20 TABLET, DELAYED RELEASE ORAL at 17:47

## 2018-04-07 RX ADMIN — INSULIN ASPART SCH: 100 INJECTION, SOLUTION INTRAVENOUS; SUBCUTANEOUS at 21:42

## 2018-04-07 NOTE — PD.PSY.CON
Provisional Diagnosis


Admission Date


Apr 6, 2018 at 23:02


Hana I.


Psychological behavioral factors associated with disorders of disease 

classified elsewhere





History of Present Illness


Service


Psychiatry


Consult Requested By


ED


Reason for Consult


[place] act, suicidal ideation


Primary Care Physician


Unknown


HPI


Patient is a 82-year-old  woman, , domiciled with daughter and 

her , with no formal past psychiatric history, past medical history 

significant for chronic back pain, diabetes, who was brought under [place] act 

after patient had told  that she wanted to kill herself which 

psychiatry was consulted for further evaluation.  Patient was found lying 

hospital bed noted calm and cooperative.  Patient states that she had recently 

fallen and [place] at her her arm which after being discharged back home 

had home health services put in place for assistance and services and when 

visiting services had come to help patient with she stated "life is miserable" 

which patient had been interpreted to have made a suicidal statement and 

subsequently put under Baker act and brought to the hospital for further 

evaluation and management.  Patient states that she would never take her life, 

she had a children, to have passed away due to motor vehicle accidents, the 

most recent being a couple of months ago.  Patient states that she loves life, 

is happy, recently had a  move in 2-3 weeks ago in her home, future 

oriented, states that she would never do anything to end her life.  Collateral 

information obtained by daughter states "my mother would never say that, she 

loves herself too much".  She reports that patient has never had any previous 

episode of having expressed any suicide ideations, no previous depressive 

symptoms, no episodes of isolation or crying, or noted to be sad or depressed.  

She states that she is expecting her mother come home with no safety concerns 

at this time.


Family psychiatric history: Denies


Past psychiatric history: Denies previous psychiatric diagnoses, no prior 

psychiatric admissions, no previous suicide attempt or self-injurious behavior, 

no previous health services, no medication trials in the past.  No history of 

abuse.


Past medical history: Chronic back pain, diabetes


Allergies: Penicillin, Versed


Substance use history: Denies


Social history:  for the past 5 years, domiciled with daughter and new 

 for the past 2-3 weeks, unemployed.  Patient's collateral contact is 

Cathy Bowers,[phone].





Past Family Social History


Coded Allergies:  


     No Known Allergies (Unverified , 4/6/18)





Current Medications








 Medications


  (Trade)  Dose


 Ordered  Sig/Renzo


 Route  Start Time


 Stop Time Status Last Admin


 


  (D50w (Vial) Inj)  50 ml  UNSCH  PRN


 IV PUSH  4/6/18 23:00


     


 


 


  (Glucagon Inj)  1 mg  UNSCH  PRN


 OTHER  4/6/18 23:00


     


 


 


  (NS Flush)  2 ml  UNSCH  PRN


 IV FLUSH  4/6/18 23:00


     


 


 


  (NS Flush)  2 ml  BID


 IV FLUSH  4/7/18 09:00


    4/7/18 09:00


 


 


  (Zofran Inj)  4 mg  Q6H  PRN


 IVP  4/6/18 23:00


     


 


 


  (Tylenol)  650 mg  Q6H  PRN


 PO  4/6/18 23:00


    4/7/18 04:26


 


 


  (Ariella-Colace)  1 tab  BID


 PO  4/7/18 09:00


     


 


 


  (Milk Of


 Magnesia Liq)  30 ml  Q12H  PRN


 PO  4/6/18 23:00


     


 


 


  (Senokot)  17.2 mg  Q12H  PRN


 PO  4/6/18 23:00


     


 


 


  (Dulcolax Supp)  10 mg  DAILY  PRN


 RECTAL  4/6/18 23:00


     


 


 


  (Lactulose Liq)  30 ml  DAILY  PRN


 PO  4/6/18 23:00


     


 


 


  (Norco  5-325 Mg)  1 tab  Q4H  PRN


 PO  4/7/18 13:00


    4/7/18 12:35


 











Physical Exam


Vital Signs





Vital Signs








  Date Time  Temp Pulse Resp B/P (MAP) Pulse Ox O2 Delivery O2 Flow Rate FiO2


 


4/7/18 12:07 98.2 62 20 180/72 (108) 96   


 


4/6/18 18:24      Nasal Cannula 2.00 














I/O   


 


 4/7/18 4/7/18 4/8/18





 08:00 16:00 00:00


 


Intake Total 200 ml  


 


Balance 200 ml  








Lab Results











Test


  4/6/18


18:20 4/6/18


20:58 4/7/18


06:59


 


White Blood Count 6.4 TH/MM3   7.7 TH/MM3 


 


Red Blood Count 3.40 MIL/MM3   3.62 MIL/MM3 


 


Hemoglobin 9.8 GM/DL   10.5 GM/DL 


 


Hematocrit 30.1 %   31.6 % 


 


Mean Corpuscular Volume 88.6 FL   87.3 FL 


 


Mean Corpuscular Hemoglobin 28.8 PG   29.0 PG 


 


Mean Corpuscular Hemoglobin


Concent 32.5 % 


  


  33.2 % 


 


 


Red Cell Distribution Width 16.2 %   16.0 % 


 


Platelet Count 203 TH/MM3   347 TH/MM3 


 


Mean Platelet Volume 8.2 FL   7.9 FL 


 


Neutrophils (%) (Auto) 53.8 %   68.3 % 


 


Lymphocytes (%) (Auto) 33.0 %   22.0 % 


 


Monocytes (%) (Auto) 10.0 %   8.1 % 


 


Eosinophils (%) (Auto) 2.3 %   1.1 % 


 


Basophils (%) (Auto) 0.9 %   0.5 % 


 


Neutrophils # (Auto) 3.4 TH/MM3   5.3 TH/MM3 


 


Lymphocytes # (Auto) 2.1 TH/MM3   1.7 TH/MM3 


 


Monocytes # (Auto) 0.6 TH/MM3   0.6 TH/MM3 


 


Eosinophils # (Auto) 0.1 TH/MM3   0.1 TH/MM3 


 


Basophils # (Auto) 0.1 TH/MM3   0.0 TH/MM3 


 


CBC Comment DIFF FINAL   DIFF FINAL 


 


Differential Comment     


 


Salicylates Level


  LESS THAN 1.7


MG/DL 


  


 


 


Blood Urea Nitrogen  13 MG/DL  12 MG/DL 


 


Creatinine  0.82 MG/DL  0.60 MG/DL 


 


Random Glucose  34 MG/DL  110 MG/DL 


 


Total Protein  7.8 GM/DL  6.9 GM/DL 


 


Albumin  2.8 GM/DL  2.5 GM/DL 


 


Calcium Level  8.7 MG/DL  8.7 MG/DL 


 


Alkaline Phosphatase  142 U/L  125 U/L 


 


Aspartate Amino Transf


(AST/SGOT) 


  19 U/L 


  17 U/L 


 


 


Alanine Aminotransferase


(ALT/SGPT) 


  9 U/L 


  8 U/L 


 


 


Total Bilirubin  0.5 MG/DL  0.5 MG/DL 


 


Sodium Level  140 MEQ/L  139 MEQ/L 


 


Potassium Level  3.8 MEQ/L  4.1 MEQ/L 


 


Chloride Level  106 MEQ/L  104 MEQ/L 


 


Carbon Dioxide Level  24.5 MEQ/L  29.2 MEQ/L 


 


Anion Gap  10 MEQ/L  6 MEQ/L 


 


Estimat Glomerular Filtration


Rate 


  67 ML/MIN 


  96 ML/MIN 


 


 


Thyroid Stimulating Hormone


3rd Gen 


  3.040 uIU/ML 


  


 


 


Acetaminophen Level


  


  LESS THAN 2.0


MCG/ML 


 


 


Ethyl Alcohol Level


  


  LESS THAN 3


MG/DL 


 


 


Hemoglobin A1c   6.6 % 











Assessment & Plan


Problem List:  


(1) Psychological and behavioral factors associated with disorders or diseases 

classified elsewhere


ICD Codes:  F54 - Psychological and behavioral factors associated with 

disorders or diseases classified elsewhere


Assessment & Plan


Patient is a 82-year-old  woman with no past psychiatric history, was 

brought in under [place] act due to reported suicide ideations which patient 

denies at this time and collateral formation confirm the patient with no 

history of depression or self-injurious behavior or suicide attempt in the 

past.  Patient this time is psychiatrically clear, currently not a danger to 

self or others.  Moul[place] act will be listed.  Patient to continue recommendations 

as per prior medical team.  Patient's daughter had expressed difficulty with 

being able to think of patient,  to assist in having patient 

transported back home when medically cleared.  Consult appreciated.











Cezar Torres MD Apr 7, 2018 15:43

## 2018-04-08 VITALS — HEART RATE: 63 BPM

## 2018-04-08 VITALS
DIASTOLIC BLOOD PRESSURE: 66 MMHG | SYSTOLIC BLOOD PRESSURE: 146 MMHG | OXYGEN SATURATION: 95 % | HEART RATE: 60 BPM | RESPIRATION RATE: 16 BRPM | TEMPERATURE: 98.7 F

## 2018-04-08 VITALS
SYSTOLIC BLOOD PRESSURE: 187 MMHG | DIASTOLIC BLOOD PRESSURE: 78 MMHG | HEART RATE: 61 BPM | RESPIRATION RATE: 16 BRPM | TEMPERATURE: 98.7 F | OXYGEN SATURATION: 97 %

## 2018-04-08 VITALS
SYSTOLIC BLOOD PRESSURE: 154 MMHG | HEART RATE: 66 BPM | OXYGEN SATURATION: 98 % | TEMPERATURE: 97.9 F | RESPIRATION RATE: 20 BRPM | DIASTOLIC BLOOD PRESSURE: 68 MMHG

## 2018-04-08 VITALS
DIASTOLIC BLOOD PRESSURE: 79 MMHG | TEMPERATURE: 98.2 F | HEART RATE: 60 BPM | SYSTOLIC BLOOD PRESSURE: 180 MMHG | RESPIRATION RATE: 18 BRPM | OXYGEN SATURATION: 96 %

## 2018-04-08 VITALS
SYSTOLIC BLOOD PRESSURE: 149 MMHG | RESPIRATION RATE: 20 BRPM | TEMPERATURE: 97.9 F | OXYGEN SATURATION: 96 % | HEART RATE: 66 BPM | DIASTOLIC BLOOD PRESSURE: 73 MMHG

## 2018-04-08 RX ADMIN — PANTOPRAZOLE SODIUM SCH MG: 20 TABLET, DELAYED RELEASE ORAL at 09:52

## 2018-04-08 RX ADMIN — ENOXAPARIN SODIUM SCH MG: 40 INJECTION SUBCUTANEOUS at 19:20

## 2018-04-08 RX ADMIN — GABAPENTIN SCH MG: 300 CAPSULE ORAL at 09:53

## 2018-04-08 RX ADMIN — GABAPENTIN SCH MG: 300 CAPSULE ORAL at 19:20

## 2018-04-08 RX ADMIN — LISINOPRIL SCH MG: 20 TABLET ORAL at 09:53

## 2018-04-08 RX ADMIN — INSULIN ASPART SCH: 100 INJECTION, SOLUTION INTRAVENOUS; SUBCUTANEOUS at 19:12

## 2018-04-08 RX ADMIN — Medication SCH ML: at 21:46

## 2018-04-08 RX ADMIN — ISOSORBIDE MONONITRATE SCH MG: 60 TABLET, EXTENDED RELEASE ORAL at 06:03

## 2018-04-08 RX ADMIN — STANDARDIZED SENNA CONCENTRATE AND DOCUSATE SODIUM SCH TAB: 8.6; 5 TABLET, FILM COATED ORAL at 21:46

## 2018-04-08 RX ADMIN — Medication SCH ML: at 09:00

## 2018-04-08 RX ADMIN — STANDARDIZED SENNA CONCENTRATE AND DOCUSATE SODIUM SCH TAB: 8.6; 5 TABLET, FILM COATED ORAL at 09:52

## 2018-04-08 RX ADMIN — ATORVASTATIN CALCIUM SCH MG: 40 TABLET, FILM COATED ORAL at 09:54

## 2018-04-08 RX ADMIN — INSULIN ASPART SCH: 100 INJECTION, SOLUTION INTRAVENOUS; SUBCUTANEOUS at 21:47

## 2018-04-08 RX ADMIN — DULOXETINE HYDROCHLORIDE SCH MG: 30 CAPSULE, DELAYED RELEASE ORAL at 09:52

## 2018-04-08 RX ADMIN — ASPIRIN 81 MG SCH MG: 81 TABLET ORAL at 09:53

## 2018-04-08 RX ADMIN — INSULIN ASPART SCH UNITS: 100 INJECTION, SUSPENSION SUBCUTANEOUS at 19:26

## 2018-04-08 RX ADMIN — GABAPENTIN SCH MG: 300 CAPSULE ORAL at 13:42

## 2018-04-08 RX ADMIN — OXYCODONE HYDROCHLORIDE AND ACETAMINOPHEN PRN TAB: 7.5; 325 TABLET ORAL at 06:04

## 2018-04-08 RX ADMIN — METOPROLOL TARTRATE SCH MG: 50 TABLET, FILM COATED ORAL at 09:52

## 2018-04-08 RX ADMIN — INSULIN ASPART SCH: 100 INJECTION, SOLUTION INTRAVENOUS; SUBCUTANEOUS at 08:00

## 2018-04-08 RX ADMIN — INSULIN ASPART SCH: 100 INJECTION, SOLUTION INTRAVENOUS; SUBCUTANEOUS at 12:00

## 2018-04-09 VITALS
HEART RATE: 59 BPM | OXYGEN SATURATION: 97 % | DIASTOLIC BLOOD PRESSURE: 73 MMHG | RESPIRATION RATE: 16 BRPM | SYSTOLIC BLOOD PRESSURE: 176 MMHG | TEMPERATURE: 98.7 F

## 2018-04-09 VITALS — HEART RATE: 63 BPM

## 2018-04-09 VITALS
SYSTOLIC BLOOD PRESSURE: 155 MMHG | HEART RATE: 64 BPM | DIASTOLIC BLOOD PRESSURE: 70 MMHG | OXYGEN SATURATION: 98 % | TEMPERATURE: 97.7 F | RESPIRATION RATE: 16 BRPM

## 2018-04-09 VITALS
SYSTOLIC BLOOD PRESSURE: 160 MMHG | HEART RATE: 63 BPM | OXYGEN SATURATION: 99 % | RESPIRATION RATE: 16 BRPM | DIASTOLIC BLOOD PRESSURE: 70 MMHG | TEMPERATURE: 97.8 F

## 2018-04-09 VITALS
HEART RATE: 64 BPM | SYSTOLIC BLOOD PRESSURE: 170 MMHG | DIASTOLIC BLOOD PRESSURE: 71 MMHG | TEMPERATURE: 98 F | RESPIRATION RATE: 16 BRPM

## 2018-04-09 RX ADMIN — LISINOPRIL SCH MG: 20 TABLET ORAL at 08:48

## 2018-04-09 RX ADMIN — GABAPENTIN SCH MG: 300 CAPSULE ORAL at 08:47

## 2018-04-09 RX ADMIN — INSULIN ASPART SCH: 100 INJECTION, SOLUTION INTRAVENOUS; SUBCUTANEOUS at 08:46

## 2018-04-09 RX ADMIN — STANDARDIZED SENNA CONCENTRATE AND DOCUSATE SODIUM SCH TAB: 8.6; 5 TABLET, FILM COATED ORAL at 08:48

## 2018-04-09 RX ADMIN — INSULIN ASPART SCH UNITS: 100 INJECTION, SUSPENSION SUBCUTANEOUS at 08:00

## 2018-04-09 RX ADMIN — ATORVASTATIN CALCIUM SCH MG: 40 TABLET, FILM COATED ORAL at 08:48

## 2018-04-09 RX ADMIN — ISOSORBIDE MONONITRATE SCH MG: 60 TABLET, EXTENDED RELEASE ORAL at 06:11

## 2018-04-09 RX ADMIN — OXYCODONE HYDROCHLORIDE AND ACETAMINOPHEN PRN TAB: 7.5; 325 TABLET ORAL at 08:48

## 2018-04-09 RX ADMIN — GABAPENTIN SCH MG: 300 CAPSULE ORAL at 13:18

## 2018-04-09 RX ADMIN — INSULIN ASPART SCH: 100 INJECTION, SOLUTION INTRAVENOUS; SUBCUTANEOUS at 13:31

## 2018-04-09 RX ADMIN — ASPIRIN 81 MG SCH MG: 81 TABLET ORAL at 08:47

## 2018-04-09 RX ADMIN — METOPROLOL TARTRATE SCH MG: 50 TABLET, FILM COATED ORAL at 08:47

## 2018-04-09 RX ADMIN — PANTOPRAZOLE SODIUM SCH MG: 20 TABLET, DELAYED RELEASE ORAL at 08:48

## 2018-04-09 RX ADMIN — Medication SCH ML: at 08:48

## 2018-04-09 RX ADMIN — DULOXETINE HYDROCHLORIDE SCH MG: 30 CAPSULE, DELAYED RELEASE ORAL at 08:48

## 2018-04-09 NOTE — HHI.FF
Face to Face Verification


Diagnosis:  


(1) Diabetes mellitus


(2) Depression


(3) Humerus fracture


(4) Hypoglycemia


Physical Therapy


Order:  Evaluate and Treat, Improve ambulation, Strength and gait training





Home Health Nursing








Order: Medical education





 Signs/symptoms of disease process





 Diabetic education





 Nursing assessment with vital signs

















I have seen patient Cathy Barrera on 4/9/18. My clinical findings support the 

need for the requested home health care services because:








 Ltd mobility - disease progression





 Deconditioned w/ increased weakness





 Med compliance is questionable





 Limited ability to care for self














I certify that my clinical findings support that this patient is homebound 

because:








 Unsteady gait/balance





 Unsafe to leave home unassisted





 Unable to use public transportation

















Stefanie Wade PA-C Apr 9, 2018 7:36 am

## 2018-04-09 NOTE — HHI.DS
__________________________________________________





Discharge Summary


Admission Date


Apr 6, 2018 at 11:02 pm


Discharge Date:  Apr 9, 2018


Admitting Diagnosis





acute hypoglycemia, DM on insulin, suicidal ideation, BA





(1) Hypoglycemia


ICD Code:  E16.2 - Hypoglycemia, unspecified


Status:  Acute


(2) Humerus fracture


ICD Code:  S42.309A - Unspecified fracture of shaft of humerus, unspecified arm

, initial encounter for closed fracture


Status:  Acute


(3) Suicidal ideation


ICD Code:  R45.851 - Suicidal ideations


Status:  Acute


Procedures


None.


Brief History - From Admission


This is an 82-year-old female with a PMH of DM who is brought to the ER under 

Baker Act for suicidal ideation.  Per report, she had called her therapist to 

tell her she was having suicidal ideation and Police sent to her home.  Pt 

unable to give much history as lethargic on exam, however able to answer some 

questions in Syriac.  Tells me she takes Insulin 68u qam, which she took this 

morning, in addition to sliding scale Insulin.  Denies fever, chills, nausea or 

vomiting.  Per records, pt w/ recent fall for which she was seen in [place], found to have humerus fracture, s/p surgery, currently in splint.  While 

in ER, pt noted to be hypoglycemic w/ BS 34, s/p Orange Juice and Dextrose, 

repeat BS 45, later 71. Pt remains lethargic.


CBC/BMP:  


4/7/18 0659                                                                    

            4/7/18 0659





Significant Findings





Laboratory Tests








Test


  4/6/18


18:20 4/6/18


20:58 4/7/18


06:59


 


Red Blood Count


  3.40 MIL/MM3


(4.00-5.30) 


  3.62 MIL/MM3


(4.00-5.30)


 


Hemoglobin


  9.8 GM/DL


(11.6-15.3) 


  10.5 GM/DL


(11.6-15.3)


 


Hematocrit


  30.1 %


(35.0-46.0) 


  31.6 %


(35.0-46.0)


 


Monocytes (%) (Auto)


  10.0 %


(0.0-8.0) 


  8.1 %


(0.0-8.0)


 


Salicylates Level


  LESS THAN 1.7


MG/DL 


  


 


 


Random Glucose


  


  34 MG/DL


() 110 MG/DL


()


 


Albumin


  


  2.8 GM/DL


(3.4-5.0) 2.5 GM/DL


(3.4-5.0)


 


Alkaline Phosphatase


  


  142 U/L


() 125 U/L


()


 


Alanine Aminotransferase


(ALT/SGPT) 


  9 U/L (10-53) 


  8 U/L (10-53) 


 


 


Estimat Glomerular Filtration


Rate 


  67 ML/MIN


(>89) 


 


 


Acetaminophen Level


  


  LESS THAN 2.0


MCG/ML 


 


 


Hemoglobin A1c


  


  


  6.6 %


(4.3-6.0)








Imaging





Last Impressions








Wrist X-Ray 4/6/18 0000 Signed





Impressions: 





 Service Date/Time:  Friday, April 6, 2018 21:24 - CONCLUSION:  Osteopenia with 





 degenerative changes, no fracture     Manuel Garrett MD  FACR


 


Ribs X-Ray 4/6/18 0000 Signed





Impressions: 





 Service Date/Time:  Friday, April 6, 2018 19:46 - CONCLUSION: Right rib 





 fractures.  No pneumothorax.  Limited exam.      Manuel Garrett MD  FACR


 


Radius/Ulna X-Ray 4/6/18 0000 Signed





Impressions: 





 Service Date/Time:  Friday, April 6, 2018 19:52 - CONCLUSION: Very limited 

exam. 





  Subtle fractures cannot be excluded.      Manuel Garrett MD  FACR


 


Humerus X-Ray 4/6/18 0000 Signed





Impressions: 





 Service Date/Time:  Friday, April 6, 2018 21:22 - CONCLUSION: Plate in good 





 position.  Anatomic alignment.     Manuel Garrett MD  FACR


 


Head CT 4/6/18 0000 Signed





Impressions: 





 Service Date/Time:  Friday, April 6, 2018 20:00 - CONCLUSION: Negative for 

acute 





 process     Manuel Garrett MD  FACR








PE at Discharge


GENERAL: Well-nourished, well-developed elderly female patient in NAD.


SKIN: Warm and dry. No rash.


HEENT:  Normocephalic. Atraumatic.Pupils equal and round. Mucous membranes pink 

and moist.


NECK: Supple. Trachea midline.  


CARDIOVASCULAR: Regular rate and rhythm.  S1, S2 noted. No murmur appreciated. 

AICD left chest. 


RESPIRATORY: No accessory muscle use. Clear to auscultation. Breath sounds 

equal bilaterally.  


GASTROINTESTINAL: Abdomen soft, non-tender, nondistended. Normoactive bowel 

sounds x4.


MUSCULOSKELETAL: Extremities without clubbing, cyanosis, or edema. RUE in splint

/ACE wrap with sling. 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 

and well-healed. 


NEUROLOGICAL: Awake and alert. No obvious cranial nerve deficits.  Motor 

grossly within normal limits.  Normal speech.


PSYCHIATRIC: Slightly anxious; insight and judgment normal.


Pt update on day of discharge


Follow up for hypoglycemia. The patient reports feeling well today and wants to 

go home. Blood sugars stable. Denies any headache, lightheadedness, dizziness, 

chest pain, shortness breath, or abdominal complaints.  She admits to feeling 

slightly anxious but she believes this is due to not receiving her Xanax while 

in the hospital.  She states she takes her Xanax 2-3x daily at home.  Discussed 

changes to her 70/30 insulin dosing, patient verbalized understanding.  She has 

no new medical complaints at this time.


Hospital Course


82-year-old female with a PMH of DM who is brought to the ER under Baker Act 

for suicidal ideation.  Patient found to be lethargic.





DM 2, with hypoglycemia: Patient has been taking 70/30 insulin 68 units in a.m. 

and 35 units in p.m. initial blood glucose on ED arrival was 34. s/p D50 and 

Orange juice w/ repeat BS 45 and 71. She received D5 NS overnight 4/6-4/7. BG 

improving, discontinued fluids. HgbA1c 6.6. Monitor accu-checks and cover with 

SSI. BG started to increase to 180s, therefore started on 70/30 10u bid. BG in 

the low 100s, therefore decreased to 70/30 5u bid at discharge. Diabetic 

education ordered and will be completed prior to discharge. BG stable. 

Instructed patient to keep BG log and follow up with PCP for dosing 

adjustments. 





Suicidal ideation: Denies any suicidal ideation on exam. Psychiatry consulted, 

Lifted Moul[place] act.





Humerus fracture S/p recent fall w/ splint in [place], outpatient Ortho 

follow up as scheduled.  Humerus X-ray w/ plate in good position. Patient has 

been taking oxycodone from home, continued oxycodone. 





Anxiety: Patient is on alprazolam 0.5 at home, advised on caution with this 

medication. Outpatient f/up. 





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 Continued home meds. Stable.


Pt Condition on Discharge:  Stable


Discharge Disposition:  Disch w/ Home Health Serv


Discharge Time:  > 30 minutes


Discharge Instructions


DIET: Follow Instructions for:  Diabetic Diet


Activities you can perform:  Regular-No Restrictions


Follow up Referrals:  


Orthopedics - 1 Week


Pain Management


PCP Follow-up - 2-3 Days





New Medications:  


Insulin Human Isophane-Regular 70-30 Inj (Novolin 70-30 Inj) 1,000 Unit/10 Ml 

Vial


5 UNITS SQ BID for Blood Sugar Management for 30 Days, #60 INJECTION 0 Refills





 


Continued Medications:  


Alprazolam (Alprazolam) 0.5 Mg Tab


0.5 MG PO Q8H PRN for ANXIETY for 7 Days, #21 TAB 0 Refills





Aspirin (Aspirin) 81 Mg Chew


81 MG CHEW DAILY, TAB 0 Refills





Duloxetine DR (Duloxetine DR) 60 Mg Capdr


90 MG PO DAILY, #30 CAP 0 Refills





Esomeprazole DR (Esomeprazole DR) 20 Mg Capdr


20 MG PO DAILY, #30 CAP 0 Refills





Gabapentin (Gabapentin) 300 Mg Cap


300 MG PO TID, #90 CAP 0 Refills





Isosorbide Mononitrate (Isosorbide Mononitrate) 10 Mg Tab


60 MG PO DAILY for Prevent Chest Pain, #60 TAB


Take 2 doses 7 hours apart.


Lisinopril (Lisinopril) 20 Mg Tab


20 MG PO DAILY, #30 TAB 0 Refills





Metoprolol Tartrate (Metoprolol Tartrate) 50 Mg Tab


50 MG PO DAILY, #30 TAB 0 Refills





Oxycodone-Acetaminophen (Percocet)  mg Tab


1 TAB PO Q6H PRN for PAIN for 7 Days, #28 TAB 0 Refills





Rosuvastatin (Rosuvastatin) 20 Mg Tab


20 MG PO DAILY for Cholesterol Management, #30 TAB 0 Refills





 


Discontinued Medications:  


Esomeprazole DR (Nexium) 20 Mg Capdr


20 MG PO DAILY, CAP 0 Refills





Insulin Human Isophane-Regular 70-30 Inj (Novolin 70-30 Inj) 1,000 Unit/10 Ml 

Vial


65 UNITS SQ q am for Blood Sugar Management, ML 0 Refills





Insulin Human Isophane-Regular 70-30 Inj (Novolin 70-30 Inj) 1,000 Unit/10 Ml 

Vial


35 UNITS SQ q pm for Blood Sugar Management, ML 0 Refills

















Stefanie Wade PA-C Apr 9, 2018 9:01 am

## 2019-08-13 NOTE — HHI.PR
Subjective


Remarks


Follow-up visit DM with severe hypoglycemia, humerus fracture, chronic pain, 

anxiety, SI.  Patient seen and examined today.  [place] act lifted by psychiatry.

  Patient denies any SI/HI.  States she is in a lot of pain especially on her 

chest and on her back.  States that she follows pain management Dr. Bronson 

is giving her oxycodone.  She also gets Xanax from her PCP Dr. Yo in 

[place].  Patient reports she is taking 70/30 insulin.  Does not remember how 

much she takes.  She is oriented to hospital, city, state.  She thinks it is 

March 2018.  She is unable to recall all her medications and states that her 

daughter knows because she is the one giving her the medications. On O2 NC, sob/

dyspnea on exertion reported.  States she is home O2.  Denies  palpitations, 

headaches, dizziness. Denies fevers, chills, n/v/d.  Denies dysuria.  Denies 

blurry vision, change in vision.  Denies unilateral weakness.





Objective


Vitals





Vital Signs








  Date Time  Temp Pulse Resp B/P (MAP) Pulse Ox O2 Delivery O2 Flow Rate FiO2


 


4/7/18 12:07 98.2 62 20 180/72 (108) 96   


 


4/7/18 08:43 97.9 68 20 198/80 (119) 96   


 


4/7/18 05:02   18     


 


4/7/18 01:01 98.1 61 16 137/62 (87) 92   


 


4/6/18 18:24 96.2 60 18 140/63 (88) 95 Nasal Cannula 2.00 














I/O      


 


 4/6/18 4/6/18 4/6/18 4/7/18 4/7/18 4/7/18





 07:00 15:00 23:00 07:00 15:00 23:00


 


Intake Total    200 ml  


 


Balance    200 ml  


 


      


 


Intake Oral    200 ml  








Result Diagram:  


4/7/18 0659                                                                    

            4/7/18 0659





Imaging





Last Impressions








Wrist X-Ray 4/6/18 0000 Signed





Impressions: 





 Service Date/Time:  Friday, April 6, 2018 21:24 - CONCLUSION:  Osteopenia with 





 degenerative changes, no fracture     Manuel Garrett MD  FACR


 


Ribs X-Ray 4/6/18 0000 Signed





Impressions: 





 Service Date/Time:  Friday, April 6, 2018 19:46 - CONCLUSION: Right rib 





 fractures.  No pneumothorax.  Limited exam.      Manuel Garrett MD  FACR


 


Radius/Ulna X-Ray 4/6/18 0000 Signed





Impressions: 





 Service Date/Time:  Friday, April 6, 2018 19:52 - CONCLUSION: Very limited 

exam. 





  Subtle fractures cannot be excluded.      MD CLARISA DiezR


 


Humerus X-Ray 4/6/18 0000 Signed





Impressions: 





 Service Date/Time:  Friday, April 6, 2018 21:22 - CONCLUSION: Plate in good 





 position.  Anatomic alignment.     Manuel Garrett MD  FACR


 


Head CT 4/6/18 0000 Signed





Impressions: 





 Service Date/Time:  Friday, April 6, 2018 20:00 - CONCLUSION: Negative for 

acute 





 process     Manuel Garrett MD  FACR








Objective Remarks


GENERAL: This is a well-nourished, well-developed patient, in no apparent 

distress.


SKIN: Warm and dry.


HEENT: Normocephalic. Pupils equal round and reactive. Nose without bleeding. 

Airway patent.


NECK: Trachea midline. No JVD. Supple.


CARDIOVASCULAR: Regular rate and rhythm without murmurs, gallops, or rubs. PPM/

AICD Left SC


RESPIRATORY: Clear to auscultation. Breath sounds equal bilaterally. No wheezes

, rales, or rhonchi.  


GASTROINTESTINAL: Abdomen soft, non-tender, nondistended. Bowel Sounds 

normoactive x4.


MUSCULOSKELETAL: Extremities without clubbing, cyanosis.  Right upper extremity 

sling in place, Ace wrap in place. 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


NEUROLOGICAL: Awake and alert. Repetitive. Oriented to place, person. No focal 

neuro deficit.  Normal speech. Anxious.





A/P


Problem List:  


(1) Hypoglycemia


ICD Code:  E16.2 - Hypoglycemia, unspecified


Status:  Acute


(2) Humerus fracture


ICD Code:  S42.309A - Unspecified fracture of shaft of humerus, unspecified arm

, initial encounter for closed fracture


Status:  Acute


(3) Suicidal ideation


ICD Code:  R45.851 - Suicidal ideations


Status:  Acute


Assessment and Plan


82-year-old female with a PMH of DM who is brought to the ER under [place] Act 

for suicidal ideation.  Patient found to be lethargic.





DM 2, with hypoglycemia


   -Patient has been taking 70/30 insulin 68 units in a.m. and 35 units in p.m. 

initial blood glucose on ED arrival was 34. s/p D50 and Orange juice w/ repeat 

BS 45 and 71.


   -She had D5 NS overnight. BG improving


   -Continue Accu-Cheks.  Will start insulin sliding scale.


   -Place on 1800 ADA diet.  Discussed with patient extensively that her 7030 

medications will be adjusted when she gets discharged.





Suicidal ideation


   -Denies any suicidal ideation on exam


   -Psychiatry consulted, appreciate recommendations.  Lifted [place] act.





Humerus fracture


S/p recent fall w/ splint in [place], outpatient Ortho follow up as 

scheduled.  


   -Humerus X-ray w/ plate in good position.


   -Patient has been taking oxycodone from home, will restart oxycodone will 

hold off benzos


   -Patient could have polypharmacy effect as she is being given oxycodone and 

alprazolam at home.





Anxiety


   -Patient is on alprazolam 0.5 at home.  We will hold off for now as she came 

in with the lethargy, she also has a recent fall


   -Ativan PRN





HTN


CAD


Hx CABG


   -PPM/ AICD in place


   -Will continue home medication


   -Clonidine as needed


   -Monitor BP trend





DVT Prop Lovenox


Discharge Planning


If blood sugar improves plan to DC home tomorrow with lower dose 70/30 insulin.





Problem Qualifiers





(1) Humerus fracture:  


Qualified Codes:  S42.401D - Unspecified fracture of lower end of right humerus

, subsequent encounter for fracture with routine healing








Lynnette Reyes Apr 7, 2018 15:55
Subjective


Remarks


Patient offers no new complaints/concerns





Objective





Vital Signs








  Date Time  Temp Pulse Resp B/P (MAP) Pulse Ox O2 Delivery O2 Flow Rate FiO2


 


4/8/18 12:29 97.9 66 20 149/73 (98) 96   


 


4/8/18 08:20 97.9 66 20 154/68 (96) 98   


 


4/8/18 07:04   15     


 


4/8/18 02:59 98.7 60 16 146/66 (92) 95   


 


4/8/18 00:30  63      


 


4/7/18 23:13 98.8 60 16 125/60 (81) 96   


 


4/7/18 20:30      Nasal Cannula 2.00 


 


4/7/18 19:56 98.3 65 20 197/78 (117) 96   


 


4/7/18 16:39 98.6 62 18 210/86 (127) 62   














I/O      


 


 4/7/18 4/7/18 4/7/18 4/8/18 4/8/18 4/8/18





 07:00 15:00 23:00 07:00 15:00 23:00


 


Intake Total 200 ml 500 ml 500 ml   


 


Output Total   800 ml   


 


Balance 200 ml 500 ml -300 ml   


 


      


 


Intake Oral 200 ml 500 ml 500 ml   


 


Output Urine Total   800 ml   


 


Stool Total   0 ml   








Result Diagram:  


4/7/18 0659                                                                    

            4/7/18 0659





Imaging





Last Impressions








Wrist X-Ray 4/6/18 0000 Signed





Impressions: 





 Service Date/Time:  Friday, April 6, 2018 21:24 - CONCLUSION:  Osteopenia with 





 degenerative changes, no fracture     Manuel Garrett MD  FACR


 


Ribs X-Ray 4/6/18 0000 Signed





Impressions: 





 Service Date/Time:  Friday, April 6, 2018 19:46 - CONCLUSION: Right rib 





 fractures.  No pneumothorax.  Limited exam.      Manuel Garrett MD  FACR


 


Radius/Ulna X-Ray 4/6/18 0000 Signed





Impressions: 





 Service Date/Time:  Friday, April 6, 2018 19:52 - CONCLUSION: Very limited 

exam. 





  Subtle fractures cannot be excluded.      Manuel Garrett MD  FACR


 


Humerus X-Ray 4/6/18 0000 Signed





Impressions: 





 Service Date/Time:  Friday, April 6, 2018 21:22 - CONCLUSION: Plate in good 





 position.  Anatomic alignment.     Manuel Garrett MD  FACR


 


Head CT 4/6/18 0000 Signed





Impressions: 





 Service Date/Time:  Friday, April 6, 2018 20:00 - CONCLUSION: Negative for 

acute 





 process     Manuel Garrett MD  FACR








Objective Remarks


GENERAL: This is a well-nourished, well-developed patient, in no apparent 

distress.


CARDIOVASCULAR: Regular rate and rhythm PPM/AICD Left SC


RESPIRATORY: Clear to auscultation. Breath sounds equal bilaterally. 


GASTROINTESTINAL: Abdomen soft, non-tender, nondistended. Normal active bowel 

sounds


MUSCULOSKELETAL: Extremities without clubbing, cyanosis, or edema.  Right upper 

extremity sling in place, Ace wrap in place. 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 

amputation noted -states is from an accident when she was 15 years old


NEURO:  Alert & Oriented x4 to person, place, time, situation.  Moves all ext x4





A/P


Assessment and Plan


82-year-old female with a PMH of DM who is brought to the ER under [place] Act 

for suicidal ideation.  Patient found to be lethargic.





DM 2, with hypoglycemia


   -Patient has been taking 70/30 insulin 68 units in a.m. and 35 units in p.m. 

initial blood glucose on ED arrival was 34. s/p D50 and Orange juice w/ repeat 

BS 45 and 71.


   -She had D5 NS overnight 4/6-4/7. BG improving


   -Continue Accu-Cheks.  Will start insulin sliding scale.


   -Place on 1800 ADA diet. 


   -consult diabetic educator


   -Resume insulin 70/30 10 Units BID and monitor





Suicidal ideation


   -Denies any suicidal ideation on exam


   -Psychiatry consulted, appreciate recommendations.  Lifted [place] act.





Humerus fracture


S/p recent fall w/ splint in [place], outpatient Ortho follow up as 

scheduled.  


   -Humerus X-ray w/ plate in good position.


   -Patient has been taking oxycodone from home, will restart oxycodone will 

hold off benzos


   -Patient could have polypharmacy effect as she is being given oxycodone and 

alprazolam at home.





Anxiety


   -Patient is on alprazolam 0.5 at home.  We will hold off for now as she came 

in with the lethargy, she also has a recent fall


   -Ativan PRN





HTN


CAD


Hx CABG


   -PPM/ AICD in place


   -Will continue home medication


   -Clonidine as needed


   -Monitor BP trend





DVT Prop Lovenox


Case discussed with Lois Pettit Apr 8, 2018 15:23
normal